# Patient Record
Sex: FEMALE | Race: WHITE | NOT HISPANIC OR LATINO | URBAN - METROPOLITAN AREA
[De-identification: names, ages, dates, MRNs, and addresses within clinical notes are randomized per-mention and may not be internally consistent; named-entity substitution may affect disease eponyms.]

---

## 2017-01-04 ENCOUNTER — COMMUNICATION - HEALTHEAST (OUTPATIENT)
Dept: TELEHEALTH | Facility: CLINIC | Age: 77
End: 2017-01-04

## 2017-01-04 ENCOUNTER — OFFICE VISIT - HEALTHEAST (OUTPATIENT)
Dept: INTERNAL MEDICINE | Facility: CLINIC | Age: 77
End: 2017-01-04

## 2017-01-04 DIAGNOSIS — E55.9 VITAMIN D DEFICIENCY: ICD-10-CM

## 2017-01-04 DIAGNOSIS — M85.80 OSTEOPENIA: ICD-10-CM

## 2017-01-04 DIAGNOSIS — M35.3 PMR (POLYMYALGIA RHEUMATICA) (H): ICD-10-CM

## 2017-01-04 RX ORDER — PAROXETINE 20 MG/1
TABLET, FILM COATED ORAL
Status: SHIPPED | COMMUNITY
Start: 2016-06-21

## 2017-01-04 ASSESSMENT — MIFFLIN-ST. JEOR: SCORE: 1288.24

## 2017-11-02 ENCOUNTER — OFFICE VISIT - HEALTHEAST (OUTPATIENT)
Dept: INTERNAL MEDICINE | Facility: CLINIC | Age: 77
End: 2017-11-02

## 2017-11-02 DIAGNOSIS — E55.9 VITAMIN D DEFICIENCY: ICD-10-CM

## 2017-11-02 DIAGNOSIS — Z23 NEED FOR INFLUENZA VACCINATION: ICD-10-CM

## 2017-11-02 DIAGNOSIS — M54.50 LOW BACK PAIN: ICD-10-CM

## 2017-11-02 DIAGNOSIS — Z00.01 ENCOUNTER FOR GENERAL ADULT MEDICAL EXAMINATION WITH ABNORMAL FINDINGS: ICD-10-CM

## 2017-11-02 DIAGNOSIS — Z78.0 MENOPAUSE: ICD-10-CM

## 2017-11-02 DIAGNOSIS — E78.5 HYPERLIPIDEMIA: ICD-10-CM

## 2017-11-02 DIAGNOSIS — H02.403 DROOPY EYELID, BILATERAL: ICD-10-CM

## 2017-11-02 LAB
CHOLEST SERPL-MCNC: 237 MG/DL
FASTING STATUS PATIENT QL REPORTED: YES
HDLC SERPL-MCNC: 83 MG/DL
LDLC SERPL CALC-MCNC: 139 MG/DL
TRIGL SERPL-MCNC: 73 MG/DL

## 2017-11-02 ASSESSMENT — MIFFLIN-ST. JEOR: SCORE: 1293.9

## 2017-11-03 ENCOUNTER — COMMUNICATION - HEALTHEAST (OUTPATIENT)
Dept: INTERNAL MEDICINE | Facility: CLINIC | Age: 77
End: 2017-11-03

## 2021-05-30 VITALS — BODY MASS INDEX: 30.39 KG/M2 | HEIGHT: 65 IN | WEIGHT: 182.4 LBS

## 2021-05-31 VITALS — WEIGHT: 181.9 LBS | HEIGHT: 66 IN | BODY MASS INDEX: 29.23 KG/M2

## 2021-06-08 NOTE — PROGRESS NOTES
"Chief complaint: Here to establish care    History of present illness: Miriam comes in today to establish care.  She is a friend of another patient of mine, Adelina Troncoso.  She has been diagnosed with ostial anemia but also has had a compression fracture and her previous provider was trying to talk her into taking alendronate but she does not wish to do so.  In reviewing her records, she does have low bone mass and a vitamin D level of 30 which was in October.  She had one episode when her calcium level was slightly elevated but upon recheck it was within normal limits.  She exercises but mainly does water aerobics and does not do a lot of weight bearing exercises.    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  She is  she has 2 children and 6 grandchildren.     Current medical problems:    Patient Active Problem List   Diagnosis     Compression fracture of lumbar vertebra     Osteopenia     Osteoarthritis of knee     Dysthymia     Vitamin D deficiency     PMR (polymyalgia rheumatica)       Past surgical history:  Past Surgical History   Procedure Laterality Date     Pilonidal cyst drainage       Blepharoplasty         Family history:    Family History   Problem Relation Age of Onset     Cancer Mother      throat cancer,  age 82     Appendicitis Father       age 35     Lymphoma Brother       age 78     Other Brother       age 21 of MVA       Review of systems: See above.  The rest of the review of systems are negative for all systems.    Current allergies, medications, and past medical history are all reviewed and updated.    Exam  General Appearance:   Pleasant and alert  Vitals:    17 1334   BP: 112/62   Pulse: 72   Weight: 182 lb 6.4 oz (82.7 kg)   Height: 5' 5\" (1.651 m)     Body mass index is 30.35 kg/(m^2).  HEENT: Sclera are clear nonicteric  Lungs: Clear to auscultation  Cardiac: Regular rate and " rhythm  Abdomen: Soft and nondistended  Extremities: No edema, cyanosis, or clubbing  Skin: No rashes  Neuro: Moves all extremities and there is no facial asymmetry    Assessment and plan:  1. Vitamin D deficiency  She is on the lower end of normal for vitamin D.  Recommend that she start on a supplement 2000 units daily.    2. PMR (polymyalgia rheumatica)  Is a distant history and she did take prednisone for a while.    3. Osteopenia  Would recommend that she increase her weightbearing exercise, get her calcium through her diet at least 3 servings daily, started on vitamin D and recheck her bone density when due in 10/2018    4.  Healthcare maintenance  Flu shot and second pneumonia vaccines are given today.        Total time spent with this patient was 30 minutes and over 50% of this was in regard to establishing care and going over previous medical history.    Perlita Irving MD  Internal and Geriatric Medicine  [facility]  1/4/2017    [facility]    Much or all of the text in this note was generated through the use of Dragon Dictate voice-to-text software. Errors in spelling or words which seem out of context are unintentional. Sound alike errors, in particular, may have escaped editing.

## 2021-06-13 NOTE — PROGRESS NOTES
Assessment and Plan:   Overall, she is doing well.  She would like to see somebody about her droopy eyes to see if she will qualify for repair.  She gets some back pain with she is doing a lot of moving and bending but it does go away with Tylenol.    1. Vitamin D deficiency  - Vitamin D, Total (25-Hydroxy)    2. Hyperlipidemia  She tries to watch her diet.  - Basic Metabolic Panel  - HM2(CBC w/o Differential)  - Thyroid Stimulating Hormone (TSH)  - Hepatic Profile  - Lipid Cascade  - Urinalysis-UC if Indicated    3. Need for influenza vaccination  - Influenza High Dose, Seasonal 65+ yrs    4. Menopause  - DXA Bone Density Scan; Future    5. Low back pain  She gets this periodically, likely related to physical activity and she wants to hold off on further treatment.    6. Droopy eyelid, bilateral  Think she will qualify for bilateral blepharoplasties and will send her to Dr. Moore  - Ambulatory referral to Ophthalmology      The patient's current medical problems were reviewed.    The following health maintenance schedule was reviewed with the patient and provided in printed form in the after visit summary:   Health Maintenance   Topic Date Due     DEPRESSION FOLLOW UP  1940     DXA SCAN  09/12/2003     INFLUENZA VACCINE RULE BASED (1) 08/01/2017     FALL RISK ASSESSMENT  01/04/2018     TD 18+ HE  05/10/2021     ADVANCE DIRECTIVES DISCUSSED WITH PATIENT  01/04/2022     PNEUMOCOCCAL POLYSACCHARIDE VACCINE AGE 65 AND OVER  Completed     PNEUMOCOCCAL CONJUGATE VACCINE FOR ADULTS (PCV13 OR PREVNAR)  Completed     ZOSTER VACCINE  Completed        Subjective:   Chief Complaint: Miriam Murcia is an 77 y.o. female here for an Annual Wellness visit.   HPI: Db is doing well.  She is been told by several people, including knee, that she should think about getting her droopy eyelids fixed.    Review of Systems:    Please see above.  The rest of the review of systems are negative for all systems.    Patient Care  "Team:  Perlita Irving MD as PCP - General (Internal Medicine)     Patient Active Problem List   Diagnosis     Compression fracture of lumbar vertebra     Osteopenia     Osteoarthritis of knee     Dysthymia     Vitamin D deficiency     PMR (polymyalgia rheumatica)     Past Medical History:   Diagnosis Date     PMR (polymyalgia rheumatica) 2017    Was on prednisone       Past Surgical History:   Procedure Laterality Date     BLEPHAROPLASTY       PILONIDAL CYST DRAINAGE        Family History   Problem Relation Age of Onset     Cancer Mother      throat cancer,  age 82     Appendicitis Father       age 35     Lymphoma Brother       age 78     Other Brother       age 21 of MVA      Social History     Social History     Marital status:      Spouse name: N/A     Number of children: 2     Years of education: N/A     Occupational History     Retired      Social History Main Topics     Smoking status: Never Smoker     Smokeless tobacco: Not on file     Alcohol use 3.6 oz/week     6 Glasses of wine per week     Drug use: No     Sexual activity: No     Other Topics Concern     Not on file     Social History Narrative    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      Current Outpatient Prescriptions   Medication Sig Dispense Refill     PARoxetine (PAXIL) 20 MG tablet TAKE ONE-HALF TABLET BY MOUTH EVERY DAY       No current facility-administered medications for this visit.       Objective:   Vital Signs:   Visit Vitals     /60 (Patient Site: Left Arm, Patient Position: Sitting, Cuff Size: Adult Regular)     Pulse 75     Ht 5' 5.5\" (1.664 m)     Wt 181 lb 14.4 oz (82.5 kg)     BMI 29.81 kg/m2        VisionScreening:  No exam data present     PHYSICAL EXAM  Wt Readings from Last 3 Encounters:   17 181 lb 14.4 oz (82.5 kg)   17 182 lb 6.4 oz (82.7 kg)     General Appearance: " Pleasant and alert  HEENT: Sclera are clear, tympanic membranes clear  Lungs: Normal respirations, clear to auscultation  Breasts: Normal-appearing breasts and nipples with no axillary adenopathy   Cardiac: Regular rate and rhythm with no appreciable murmur rub or gallop   Abdomen: Soft and nondistended  Extremities: No edema  Skin: No rashes  Neuro: Moves all extremities and has facial symmetry  Gait: Ambulates with a normal gait    Personalized prevention plan: Lifestyle interventions to promote self-management and wellness were discussed including good nutrition, ongoing physical activity, falls prevention and continuing with screening tests as recommended including density scan and flu shot and those listed in the health maintenance plan listed above.    Much or all of the text in this note was generated through the use of Dragon Dictate voice-to-text software. Errors in spelling or words which seem out of context are unintentional. Sound alike errors, in particular, may have escaped editing.      Assessment Results 11/2/2017   Activities of Daily Living No help needed   Instrumental Activities of Daily Living No help needed   Get Up and Go Score Less than 12 seconds   Mini Cog Total Score 5   Some recent data might be hidden     A Mini-Cog score of 0-2 suggests the possibility of dementia, score of 3-5 suggests no dementia    Identified Health Risks:     She is at risk for falling and has been provided with information to reduce the risk of falling at home.  Patient's advanced directive was discussed and I am comfortable with the patient's wishes.

## 2021-06-15 PROBLEM — M35.3 PMR (POLYMYALGIA RHEUMATICA) (H): Status: ACTIVE | Noted: 2017-01-04

## 2021-06-15 PROBLEM — E55.9 VITAMIN D DEFICIENCY: Status: ACTIVE | Noted: 2017-01-04
